# Patient Record
Sex: MALE | Race: OTHER | NOT HISPANIC OR LATINO | URBAN - METROPOLITAN AREA
[De-identification: names, ages, dates, MRNs, and addresses within clinical notes are randomized per-mention and may not be internally consistent; named-entity substitution may affect disease eponyms.]

---

## 2021-07-13 ENCOUNTER — OFFICE (OUTPATIENT)
Dept: URBAN - METROPOLITAN AREA CLINIC 67 | Facility: CLINIC | Age: 46
End: 2021-07-13
Payer: COMMERCIAL

## 2021-07-13 VITALS
TEMPERATURE: 97.6 F | OXYGEN SATURATION: 98 % | SYSTOLIC BLOOD PRESSURE: 150 MMHG | HEIGHT: 67 IN | HEART RATE: 72 BPM | DIASTOLIC BLOOD PRESSURE: 87 MMHG | WEIGHT: 234 LBS

## 2021-07-13 DIAGNOSIS — K74.60 UNSPECIFIED CIRRHOSIS OF LIVER: ICD-10-CM

## 2021-07-13 DIAGNOSIS — K76.6 PORTAL HYPERTENSION: ICD-10-CM

## 2021-07-13 PROCEDURE — 99213 OFFICE O/P EST LOW 20 MIN: CPT | Performed by: SPECIALIST

## 2021-07-13 NOTE — SERVICEHPINOTES
José Luis Aguilar   is seen today for a follow-up visit.  
br
br    The cirrhosis is characterized as a due to alcohol. The symptoms have been associated with ongoing alcohol ingestion,  while the symptoms have not been associated with increased abdominal girth, ascites, history of ascites, hepatomegaly, jaundice, history of encephalopathy or history of variceal hemorrhage (but minimal varices and portal gastropathy [facility] 11/15).
br
br   
br Previous diagnostic:
br EGD ([facility] then Dr. Hutton 9/5/17, 5/20/19), 
br Hgb/Hct/WBC/MCV/platelet count, liver function tests (T.B./alk.phos./AST/ALT) and hepatitis panel (A,B,C). 
br The cirrhosis are characterized as partially controlled (no hx decompensation and doing well....had umbilical hernia repair Dr. Saunders). 
br
brColon 11/25/15 Dr Rawls [facility] diverticulosis and internal hemorrhoids
br CMP/CBC/INR 5/28/21 [place]
br US 10/16/20
br
br    
7/13/21 he is  doing well.  He still  is drinking.  MELD 10 based on labs 5/28/21br 
br

## 2021-07-14 LAB — AFP, SERUM, TUMOR MARKER: 4.3 NG/ML (ref 0–8.3)

## 2021-08-31 ENCOUNTER — ON CAMPUS - OUTPATIENT (OUTPATIENT)
Dept: URBAN - METROPOLITAN AREA MEDICAL CENTER 2 | Facility: MEDICAL CENTER | Age: 46
End: 2021-08-31
Payer: COMMERCIAL

## 2021-08-31 VITALS — HEIGHT: 67 IN

## 2021-08-31 DIAGNOSIS — K76.6 PORTAL HYPERTENSION: ICD-10-CM

## 2021-08-31 DIAGNOSIS — K31.89 OTHER DISEASES OF STOMACH AND DUODENUM: ICD-10-CM

## 2021-08-31 DIAGNOSIS — I85.00 ESOPHAGEAL VARICES WITHOUT BLEEDING: ICD-10-CM

## 2021-08-31 DIAGNOSIS — K74.60 UNSPECIFIED CIRRHOSIS OF LIVER: ICD-10-CM

## 2021-08-31 PROCEDURE — 43235 EGD DIAGNOSTIC BRUSH WASH: CPT | Performed by: SPECIALIST
